# Patient Record
Sex: MALE | URBAN - NONMETROPOLITAN AREA
[De-identification: names, ages, dates, MRNs, and addresses within clinical notes are randomized per-mention and may not be internally consistent; named-entity substitution may affect disease eponyms.]

---

## 2022-12-02 ENCOUNTER — OFFICE VISIT (OUTPATIENT)
Dept: URBAN - NONMETROPOLITAN AREA CLINIC 1 | Facility: CLINIC | Age: 67
End: 2022-12-02
Payer: COMMERCIAL

## 2022-12-02 DIAGNOSIS — H11.002 PTERYGIUM OF LEFT EYE: ICD-10-CM

## 2022-12-02 DIAGNOSIS — H11.001 PTERYGIUM OF RIGHT EYE: ICD-10-CM

## 2022-12-02 DIAGNOSIS — H53.9 VISUAL DISTURBANCE: Primary | ICD-10-CM

## 2022-12-02 DIAGNOSIS — H04.123 DRY EYE SYNDROME OF BILATERAL LACRIMAL GLANDS: ICD-10-CM

## 2022-12-02 DIAGNOSIS — H11.153 PINGUECULA, BILATERAL: ICD-10-CM

## 2022-12-02 DIAGNOSIS — H25.13 AGE-RELATED NUCLEAR CATARACT, BILATERAL: ICD-10-CM

## 2022-12-02 PROCEDURE — 99204 OFFICE O/P NEW MOD 45 MIN: CPT | Performed by: OPTOMETRIST

## 2022-12-02 ASSESSMENT — INTRAOCULAR PRESSURE
OS: 17
OD: 12

## 2022-12-02 NOTE — IMPRESSION/PLAN
Impression: Age-related nuclear cataract, bilateral: H25.13. Plan: Educated pt on exam findings. Vision minimally affected at this time, did not recommend surgery at this time. Educated pt RTC sooner than next scheduled exam if pt becomes symptomatic to the glare, halos, or blur. Pt doing well with glasses, do not need to update at this time.

## 2022-12-02 NOTE — IMPRESSION/PLAN
Impression: Visual disturbance: H53.9. Plan: Pt prefers [place] speaking, tech translated for patient. Educated pt no signs of retinal tears/detachments present today. Discussed warning signs of retinal tear (RT) and retinal detachment (RD) discussed with patient. Pt. instructed to contact our office ASAP if loss of vision, any worsening of symptoms, or changes consistent with RT or RD. Educated pt RTC asap if flashes of light worsen, new floaters develop in vision or curtain/veil begin to block peripheral vision. Pt expressed understanding. RTC 1 month for repeat DFE, or PRN.

## 2022-12-02 NOTE — IMPRESSION/PLAN
Impression: Pterygium of right eye: H11.001. Plan: Pterygium can be removed when they are bothersome, either by appearance, by irritation, or by effect on the vision. Recommended sunglasses while outside. Recommended artificial tears to help with tear film.